# Patient Record
Sex: FEMALE | Race: WHITE | URBAN - METROPOLITAN AREA
[De-identification: names, ages, dates, MRNs, and addresses within clinical notes are randomized per-mention and may not be internally consistent; named-entity substitution may affect disease eponyms.]

---

## 2017-02-28 ENCOUNTER — OFFICE VISIT (OUTPATIENT)
Dept: INTERNAL MEDICINE CLINIC | Age: 27
End: 2017-02-28

## 2017-02-28 VITALS
SYSTOLIC BLOOD PRESSURE: 100 MMHG | RESPIRATION RATE: 18 BRPM | BODY MASS INDEX: 32.66 KG/M2 | DIASTOLIC BLOOD PRESSURE: 75 MMHG | HEIGHT: 61 IN | HEART RATE: 70 BPM | WEIGHT: 173 LBS

## 2017-02-28 DIAGNOSIS — Z00.00 VISIT FOR ANNUAL HEALTH EXAMINATION: Primary | ICD-10-CM

## 2017-02-28 PROCEDURE — 81002 URINALYSIS NONAUTO W/O SCOPE: CPT | Performed by: INTERNAL MEDICINE

## 2017-02-28 PROCEDURE — 99395 PREV VISIT EST AGE 18-39: CPT | Performed by: INTERNAL MEDICINE

## 2017-02-28 ASSESSMENT — ENCOUNTER SYMPTOMS
ABDOMINAL PAIN: 0
PHOTOPHOBIA: 0
TROUBLE SWALLOWING: 0
NAUSEA: 0
CONSTIPATION: 0
COLOR CHANGE: 0
CHEST TIGHTNESS: 0
SHORTNESS OF BREATH: 0

## 2017-04-10 ENCOUNTER — OFFICE VISIT (OUTPATIENT)
Dept: INTERNAL MEDICINE CLINIC | Age: 27
End: 2017-04-10

## 2017-04-10 VITALS
WEIGHT: 179 LBS | BODY MASS INDEX: 33.79 KG/M2 | SYSTOLIC BLOOD PRESSURE: 108 MMHG | HEIGHT: 61 IN | DIASTOLIC BLOOD PRESSURE: 70 MMHG | HEART RATE: 60 BPM | RESPIRATION RATE: 14 BRPM

## 2017-04-10 DIAGNOSIS — M25.531 RIGHT WRIST PAIN: Primary | ICD-10-CM

## 2017-04-10 PROCEDURE — 99212 OFFICE O/P EST SF 10 MIN: CPT | Performed by: NURSE PRACTITIONER

## 2017-04-10 PROCEDURE — G8417 CALC BMI ABV UP PARAM F/U: HCPCS | Performed by: NURSE PRACTITIONER

## 2017-04-10 PROCEDURE — 1036F TOBACCO NON-USER: CPT | Performed by: NURSE PRACTITIONER

## 2017-04-10 PROCEDURE — G8427 DOCREV CUR MEDS BY ELIG CLIN: HCPCS | Performed by: NURSE PRACTITIONER

## 2017-04-10 ASSESSMENT — ENCOUNTER SYMPTOMS
WHEEZING: 0
VOMITING: 0
NAUSEA: 0
RHINORRHEA: 0
COUGH: 0
ABDOMINAL PAIN: 0
SHORTNESS OF BREATH: 0

## 2018-09-26 PROBLEM — Z00.00 VISIT FOR ANNUAL HEALTH EXAMINATION: Status: RESOLVED | Noted: 2017-02-28 | Resolved: 2018-09-26

## 2020-12-09 ENCOUNTER — TELEPHONE (OUTPATIENT)
Dept: INTERNAL MEDICINE CLINIC | Age: 30
End: 2020-12-09

## 2020-12-09 NOTE — TELEPHONE ENCOUNTER
----- Message from Anisa Dillard MD sent at 12/9/2020 12:41 PM EST -----  Contact: [phone]  Make appt next week  ----- Message -----  From: Nnamdi Bean  Sent: 12/9/2020  10:14 AM EST  To: Anisa Dillard MD    Pt wants an appointment to see you said she is having anxiety  and wanted to start back on medication again. Is having a hard time dealing with everyday issues.

## 2020-12-17 ENCOUNTER — OFFICE VISIT (OUTPATIENT)
Dept: INTERNAL MEDICINE CLINIC | Age: 30
End: 2020-12-17

## 2020-12-17 VITALS
HEIGHT: 61 IN | SYSTOLIC BLOOD PRESSURE: 125 MMHG | TEMPERATURE: 98.3 F | DIASTOLIC BLOOD PRESSURE: 75 MMHG | BODY MASS INDEX: 32.47 KG/M2 | WEIGHT: 172 LBS | HEART RATE: 70 BPM | RESPIRATION RATE: 18 BRPM

## 2020-12-17 PROCEDURE — 99213 OFFICE O/P EST LOW 20 MIN: CPT | Performed by: INTERNAL MEDICINE

## 2020-12-17 PROCEDURE — G8484 FLU IMMUNIZE NO ADMIN: HCPCS | Performed by: INTERNAL MEDICINE

## 2020-12-17 PROCEDURE — 1036F TOBACCO NON-USER: CPT | Performed by: INTERNAL MEDICINE

## 2020-12-17 PROCEDURE — G8427 DOCREV CUR MEDS BY ELIG CLIN: HCPCS | Performed by: INTERNAL MEDICINE

## 2020-12-17 PROCEDURE — G8417 CALC BMI ABV UP PARAM F/U: HCPCS | Performed by: INTERNAL MEDICINE

## 2020-12-17 RX ORDER — ESCITALOPRAM OXALATE 10 MG/1
10 TABLET ORAL DAILY
Qty: 30 TABLET | Refills: 0 | Status: SHIPPED | OUTPATIENT
Start: 2020-12-17 | End: 2021-01-18 | Stop reason: ALTCHOICE

## 2020-12-17 RX ORDER — ESCITALOPRAM OXALATE 10 MG/1
10 TABLET ORAL DAILY
Qty: 30 TABLET | Refills: 3 | Status: SHIPPED | OUTPATIENT
Start: 2020-12-17 | End: 2020-12-17 | Stop reason: SDUPTHER

## 2020-12-17 ASSESSMENT — ENCOUNTER SYMPTOMS
SHORTNESS OF BREATH: 0
TROUBLE SWALLOWING: 0
COLOR CHANGE: 0
ABDOMINAL PAIN: 0
CHEST TIGHTNESS: 0
CONSTIPATION: 0
PHOTOPHOBIA: 0
NAUSEA: 0

## 2020-12-17 NOTE — PROGRESS NOTES
Subjective:      Patient ID: Diane Law is a 27 y.o. female. HPI    27 y. o.old female here for discussion of her anxiety issues accompanied by her mother    Pt and her mother report long hx of anxiety from young age and now getting worse with increasing issues effecting her life    She is constantly worried about small things in life. Recently contracted covid and was very worried how she is going ot avoid spreading disease and about getting worse even after she recovered from it    She is anxious among groups and constantly prays , sometimes loudly while at work as well. This has raised some concerns at work and in family    No sleep issues but has awakens easly with racing thoughts and feels tired in daytime, no headaches, no palpitations    Family also reports some obsessive issues like hand washing , repetitive statements   No Known Allergies         Current Outpatient Medications   Medication Sig Dispense Refill    escitalopram (LEXAPRO) 10 MG tablet Take 1 tablet by mouth daily 30 tablet 3     No current facility-administered medications for this visit. Review of Systems   Constitutional: Negative for activity change, diaphoresis and unexpected weight change. HENT: Negative for congestion, ear discharge, hearing loss and trouble swallowing. Eyes: Negative for photophobia and visual disturbance. Respiratory: Negative for chest tightness and shortness of breath. Cardiovascular: Negative for chest pain and palpitations. Gastrointestinal: Negative for abdominal pain, constipation and nausea. Endocrine: Negative for polyuria. Genitourinary: Negative for dysuria, flank pain and hematuria. Musculoskeletal: Negative for arthralgias and gait problem. Skin: Negative for color change. Allergic/Immunologic: Negative for immunocompromised state. Neurological: Negative for tremors, seizures, weakness and numbness. Psychiatric/Behavioral: Positive for decreased concentration and sleep disturbance. The patient is nervous/anxious. Wt Readings from Last 3 Encounters:   12/17/20 172 lb (78 kg)   04/10/17 179 lb (81.2 kg)   04/05/17 174 lb (78.9 kg)       Objective:   Physical Exam  Vitals:    12/17/20 1622   BP: 125/75   Pulse: 70   Resp: 18   Temp: 98.3 °F (36.8 °C)         General: young anxious female ,  Awake, alert and oriented. Appears to be not in any distress  Mucous Membranes:  Pink , anicteric  Neck: No JVD, no carotid bruit, no thyromegaly  Chest:  Clear to auscultation bilaterally, no added sounds  Cardiovascular:  RRR S1S2 heard, no murmurs or gallops  Abdomen:  Soft, undistended, non tender, no organomegaly, BS present  Extremities: No edema or cyanosis. Distal pulses well felt  Neurological : grossly normal  anxious      Assessment:       Diagnosis Orders   1.  Anxiety             Plan:        Anxiety -  Appears to be worried about small things and is scared about many aspects in life leading  To obsessive behavior   Started on lexapro 10 mg daily and increase as tolerated  F.w 2 months    Obesity noted - weight loss and life style modification along with dietary changes, increased physical activity encouraged

## 2020-12-17 NOTE — TELEPHONE ENCOUNTER
lexapro called in to [facility] for one fill. Lexapro was also sent to [facility] with three refills. One refill has been cancelled at [facility] since pt wanted to get one filled at [facility].

## 2021-01-18 ENCOUNTER — TELEPHONE (OUTPATIENT)
Dept: INTERNAL MEDICINE CLINIC | Age: 31
End: 2021-01-18

## 2021-01-18 RX ORDER — FLUOXETINE 10 MG/1
10 CAPSULE ORAL DAILY
Qty: 30 CAPSULE | Refills: 0 | Status: SHIPPED | OUTPATIENT
Start: 2021-01-18 | End: 2021-02-01 | Stop reason: DRUGHIGH

## 2021-01-18 NOTE — TELEPHONE ENCOUNTER
----- Message from Lazara Pierre MD sent at 1/18/2021  2:23 PM EST -----  Contact: [address] to switch PCP   Stop lexapro and change to prozac 10 mg daily . Can increase if tolerates  ----- Message -----  From: Fariha Walters  Sent: 1/18/2021  10:18 AM EST  To: Lazara Pierre MD    Patient would like to speak with you about the possibility of switching to Prozac. She states she feels her current medication, Lexapro,  is not working well for her. Her father is a patient of Dr. Rocael Loving and takes prozac and does well with it.   She also would like to speak to you about switching her PCP to Dr. Olmedo Gamma you

## 2021-02-01 RX ORDER — FLUOXETINE HYDROCHLORIDE 20 MG/1
20 CAPSULE ORAL DAILY
Qty: 30 CAPSULE | Refills: 0 | Status: SHIPPED | OUTPATIENT
Start: 2021-02-01 | End: 2021-02-25

## 2021-02-01 NOTE — TELEPHONE ENCOUNTER
----- Message from Shawna Polk MD sent at 2/1/2021  2:46 PM EST -----  Contact: Bobby Izquierdo ( Father) [phone]  Go ahead and take 2 of the 10 mg pills daily  Call in new script for 20 mg daily # 30 NRF   ----- Message -----  From: Sarah Vy  Sent: 2/1/2021   1:58 PM EST  To: Shawna Polk MD    Pt has lost her job today due to her ocd she started the Prozac 10 mg a month ago and wanted to know if you could up her dose yet she is having a lot of problems has an appointment to come in on 2-19-21 but wasn't sure if she needed to wait till then or if you can get her in before that.

## 2021-02-02 ENCOUNTER — TELEPHONE (OUTPATIENT)
Dept: INTERNAL MEDICINE CLINIC | Age: 31
End: 2021-02-02

## 2021-02-02 NOTE — TELEPHONE ENCOUNTER
----- Message from Vernelle Sicard, MD sent at 2/2/2021  8:53 AM EST -----  Contact: Itz Gamble- [phone]  We will not be able to give a note like that   ----- Message -----  From: Francis Marco Ajoslyn: 2/2/2021   8:09 AM EST  To: Vernelle Sicard, MD    Pt mother called asking if you would write a note stating that the pt is on OCD medications because they are wanting to try to get her on welfare. Pt currently lost her job. Please advise.       Itz Gamble- [phone]

## 2021-02-03 ENCOUNTER — TELEPHONE (OUTPATIENT)
Dept: INTERNAL MEDICINE CLINIC | Age: 31
End: 2021-02-03

## 2021-02-03 NOTE — TELEPHONE ENCOUNTER
----- Message from Sheba Junior MD sent at 2/3/2021  9:05 AM EST -----  Contact: Caitlin Loo- [phone]  I havent seen her even once   Just a note like that will not help her get welfare   ----- Message -----  From: Lysbeth Carrel: 2/2/2021  11:35 AM EST  To: Sheba Junior MD    Pt's mother wanting to know why not?  ----- Message -----  From: Sheba Junior MD  Sent: 2/2/2021   8:53 AM EST  To: Afsaneh Unger    We will not be able to give a note like that   ----- Message -----  From: Froilanyung Meg: 2/2/2021   8:09 AM EST  To: Sheba Junior MD    Pt mother called asking if you would write a note stating that the pt is on OCD medications because they are wanting to try to get her on welfare. Pt currently lost her job. Please advise.       Caitlin Cinda- [phone]

## 2021-02-03 NOTE — TELEPHONE ENCOUNTER
----- Message from Sharon Gardiner MD sent at 2/3/2021  9:05 AM EST -----  Contact: Danny Foy- [phone]  I havent seen her even once   Just a note like that will not help her get welfare   ----- Message -----  From: Sandra Granado: 2021  11:35 AM EST  To: Sharon Gardiner MD    Pt's mother wanting to know why not?  ----- Message -----  From: Sharon Gardiner MD  Sent: 2021   8:53 AM EST  To: Ivan Turner    We will not be able to give a note like that   ----- Message -----  From: Eulalia Bucio: 2021   8:09 AM EST  To: Sharon Gardiner MD    Pt mother called asking if you would write a note stating that the pt is on OCD medications because they are wanting to try to get her on welfare. Pt currently lost her job. Please advise.       Danny Foy- [phone]

## 2021-02-04 ENCOUNTER — OFFICE VISIT (OUTPATIENT)
Dept: INTERNAL MEDICINE CLINIC | Age: 31
End: 2021-02-04

## 2021-02-04 VITALS
TEMPERATURE: 97.9 F | SYSTOLIC BLOOD PRESSURE: 128 MMHG | HEART RATE: 80 BPM | WEIGHT: 162 LBS | DIASTOLIC BLOOD PRESSURE: 78 MMHG | BODY MASS INDEX: 30.58 KG/M2 | HEIGHT: 61 IN

## 2021-02-04 DIAGNOSIS — F41.1 GAD (GENERALIZED ANXIETY DISORDER): Primary | ICD-10-CM

## 2021-02-04 PROCEDURE — 99212 OFFICE O/P EST SF 10 MIN: CPT | Performed by: INTERNAL MEDICINE

## 2021-02-04 ASSESSMENT — ENCOUNTER SYMPTOMS
CHEST TIGHTNESS: 0
NAUSEA: 0
DIARRHEA: 0
SHORTNESS OF BREATH: 0
VOMITING: 0
WHEEZING: 0
ABDOMINAL PAIN: 0
BLOOD IN STOOL: 0
COUGH: 0

## 2021-02-04 NOTE — PROGRESS NOTES
Juana Salazar (:  1990) is a 27 y.o. female,Established patient, here for evaluation of the following chief complaint(s):  No chief complaint on file. ASSESSMENT/PLAN:   Diagnosis Orders   1. BALDO (generalized anxiety disorder)       Patient seems to be having much more than just generalized anxiety. I have referred her to a psychiatrist.  I advised her to continue the Prozac for now. SUBJECTIVE/OBJECTIVE:  HPI  Is here for follow up of anxiety. She has had anxiety ever since her childhood. But her symptoms of become worse over the past several months. The past 2 to 3 years or so she has lost multiple jobs because of this. She is very Qu[employer] but over the past few months she has been apparently having blasphemous thougths which bother her a lot. She does not like to wear a mask because \"I'm unable to see her mouth to see what it is saying\". She is not entirely clear about hearing voices. She has been taking Prozac for the past few weeks and has not made much of a difference. According to the mom who accompanied the patient the patient is not having any suicidal thoughts. Review of Systems   Constitutional: Negative for fatigue, fever and unexpected weight change. Respiratory: Negative for cough, chest tightness, shortness of breath and wheezing. Cardiovascular: Negative for chest pain, palpitations and leg swelling. Gastrointestinal: Negative for abdominal pain, blood in stool, diarrhea, nausea and vomiting. Neurological: Negative for light-headedness. Physical Exam  Constitutional:       Appearance: She is well-developed. HENT:      Head: Normocephalic and atraumatic. Eyes:      Pupils: Pupils are equal, round, and reactive to light. Neck:      Musculoskeletal: Normal range of motion and neck supple. Thyroid: No thyromegaly. Cardiovascular:      Rate and Rhythm: Normal rate and regular rhythm. Heart sounds: Normal heart sounds. No murmur. No friction rub. No gallop. Comments: No carotid bruit  Pulmonary:      Effort: Pulmonary effort is normal. No respiratory distress. Breath sounds: Normal breath sounds. No wheezing or rales. Chest:      Chest wall: No tenderness. Neurological:      Mental Status: She is alert and oriented to person, place, and time. An electronic signature was used to authenticate this note.     --Farrah Kumar MD

## 2021-02-22 ENCOUNTER — TELEPHONE (OUTPATIENT)
Dept: INTERNAL MEDICINE CLINIC | Age: 31
End: 2021-02-22

## 2021-02-22 DIAGNOSIS — F41.1 GAD (GENERALIZED ANXIETY DISORDER): Primary | ICD-10-CM

## 2021-02-24 ENCOUNTER — TELEPHONE (OUTPATIENT)
Dept: INTERNAL MEDICINE CLINIC | Age: 31
End: 2021-02-24

## 2021-02-24 NOTE — TELEPHONE ENCOUNTER
----- Message from Rashaad Zhu MD sent at 2/24/2021  4:07 PM EST -----  Contact: [id number]-[phone]  Schedule appt with psych   ----- Message -----  From: Corinne Foster  Sent: 2/23/2021   1:41 PM EST  To: Rashaad Zhu MD    Mother states that she just picked up the referral today and has not scheduled an appointment with psych yet. Sonia Adrian they are trying to decide who to take her to see and what they should do with her since she is not doing well. Please advise.   ----- Message -----  From: Rashaad Zhu MD  Sent: 2/23/2021   1:36 PM EST  To: Corinne Foster    Did she make appt with psych   ----- Message -----  From: Moe Deras: 2/23/2021  11:02 AM EST  To: Rashaad Zhu MD    Pt mother stopped in and said that she would like to talk to you because the medication that the pt is currently taking isn't helping. She is not doing well and the pt is not eating and her mother doesn't know if that is from the medication or depression.      Lionell Nissen

## 2021-02-25 RX ORDER — FLUOXETINE HYDROCHLORIDE 20 MG/1
CAPSULE ORAL
Qty: 30 CAPSULE | Refills: 0 | Status: SHIPPED | OUTPATIENT
Start: 2021-02-25 | End: 2021-04-08 | Stop reason: SDUPTHER

## 2021-04-08 ENCOUNTER — TELEPHONE (OUTPATIENT)
Dept: INTERNAL MEDICINE CLINIC | Age: 31
End: 2021-04-08

## 2021-04-08 RX ORDER — FLUOXETINE HYDROCHLORIDE 20 MG/1
CAPSULE ORAL
Qty: 30 CAPSULE | Refills: 0 | Status: SHIPPED | OUTPATIENT
Start: 2021-04-08